# Patient Record
Sex: FEMALE | Race: NATIVE HAWAIIAN OR OTHER PACIFIC ISLANDER | HISPANIC OR LATINO | URBAN - METROPOLITAN AREA
[De-identification: names, ages, dates, MRNs, and addresses within clinical notes are randomized per-mention and may not be internally consistent; named-entity substitution may affect disease eponyms.]

---

## 2023-10-25 ENCOUNTER — CLAIMS CREATED FROM THE CLAIM WINDOW (OUTPATIENT)
Dept: URBAN - METROPOLITAN AREA CLINIC 48 | Facility: CLINIC | Age: 23
End: 2023-10-25
Payer: COMMERCIAL

## 2023-10-25 VITALS
HEIGHT: 64 IN | SYSTOLIC BLOOD PRESSURE: 111 MMHG | OXYGEN SATURATION: 99 % | DIASTOLIC BLOOD PRESSURE: 75 MMHG | BODY MASS INDEX: 37.83 KG/M2 | HEART RATE: 80 BPM | WEIGHT: 221.6 LBS | TEMPERATURE: 97.5 F

## 2023-10-25 DIAGNOSIS — R63.4 WEIGHT LOSS: ICD-10-CM

## 2023-10-25 DIAGNOSIS — R10.32 LLQ PAIN: ICD-10-CM

## 2023-10-25 DIAGNOSIS — K21.9 GASTROESOPHAGEAL REFLUX DISEASE, UNSPECIFIED WHETHER ESOPHAGITIS PRESENT: ICD-10-CM

## 2023-10-25 DIAGNOSIS — R10.13 DYSPEPSIA: ICD-10-CM

## 2023-10-25 PROBLEM — 235595009: Status: ACTIVE | Noted: 2023-10-25

## 2023-10-25 PROCEDURE — 99204 OFFICE O/P NEW MOD 45 MIN: CPT | Performed by: INTERNAL MEDICINE

## 2023-10-25 PROCEDURE — 99204 OFFICE O/P NEW MOD 45 MIN: CPT | Performed by: PHYSICIAN ASSISTANT

## 2023-10-25 RX ORDER — PYRITHIONE ZINC 0.25 %
AS DIRECTED SPRAY, NON-AEROSOL (ML) TOPICAL
Status: ACTIVE | COMMUNITY

## 2023-10-25 RX ORDER — NORETHINDRONE ACETATE AND ETHINYL ESTRADIOL AND FERROUS FUMARATE 1MG-20(21)
TAKE ONE TABLET BY MOUTH EVERY MORNING KIT ORAL
Qty: 84 UNSPECIFIED | Refills: 1 | Status: ACTIVE | COMMUNITY

## 2023-10-25 RX ORDER — PANTOPRAZOLE SODIUM 40 MG/1
1 TABLET 30 MINUTES PRIOR TO BREAKFAST TABLET, DELAYED RELEASE ORAL ONCE A DAY
Qty: 30 | Refills: 3 | OUTPATIENT
Start: 2023-10-25

## 2023-10-25 RX ORDER — DICYCLOMINE HYDROCHLORIDE 20 MG/1
1 TABLET TABLET ORAL
Qty: 30 | Refills: 2 | OUTPATIENT
Start: 2023-10-25 | End: 2024-01-22

## 2023-10-25 RX ORDER — CICLOPIROX 80 MG/ML
APPLY TO AFFECTED AREA(S) TOPICALLY ONCE DAILY IN THE MORNING SOLUTION TOPICAL
Qty: 6.6 UNSPECIFIED | Refills: 0 | Status: ACTIVE | COMMUNITY

## 2023-10-25 RX ORDER — KETOCONAZOLE 20 MG/ML
WASH SCALP 2-3 TIMES WEEKLY, LEAVE ON 5MIN THEN RINSE SHAMPOO, SUSPENSION TOPICAL
Qty: 360 UNSPECIFIED | Refills: 1 | Status: ACTIVE | COMMUNITY

## 2023-10-25 RX ORDER — OMEPRAZOLE 20 MG/1
1 CAPSULE 30 MINUTES BEFORE MORNING MEAL CAPSULE, DELAYED RELEASE ORAL ONCE A DAY
Status: ON HOLD | COMMUNITY

## 2023-10-25 NOTE — HPI-TODAY'S VISIT:
24 y/o female is being referred by GYN for evaluation of chronic LLQ pain which started around April. She has had a GYN U/S suggestive of polycystic ovaries and was started on BCP. She states her GYN felt the pain should not be persisting and advised GI evaluation. The pain is described as sharp/cramping/stabbing and occurs sporadically a few times per week, but only usually lasts a few minutes at a time. She has 2-3 bowel movements per day and usually has a bowel movement soon after eating; denies any significant diarrhea, constipation, and denies blood in the stool. She has no known family h/o CRC or IBD. Additionally, she reports new onset indigestion and epigastric pain, worsened by eating, and she has lost 10 pounds in the last few months. She took OTC Prilosec for 2 weeks with relief, but symptoms returned after stopping. She denies any regular NSAID use. Labs in August showed a normal CBC and CMP.

## 2024-01-02 ENCOUNTER — DASHBOARD ENCOUNTERS (OUTPATIENT)
Age: 24
End: 2024-01-02

## 2024-01-03 ENCOUNTER — OFFICE VISIT (OUTPATIENT)
Dept: URBAN - METROPOLITAN AREA CLINIC 48 | Facility: CLINIC | Age: 24
End: 2024-01-03
Payer: COMMERCIAL

## 2024-01-03 VITALS
HEART RATE: 85 BPM | HEIGHT: 64 IN | WEIGHT: 219.2 LBS | TEMPERATURE: 97.9 F | SYSTOLIC BLOOD PRESSURE: 108 MMHG | BODY MASS INDEX: 37.42 KG/M2 | DIASTOLIC BLOOD PRESSURE: 75 MMHG

## 2024-01-03 DIAGNOSIS — R63.4 WEIGHT LOSS: ICD-10-CM

## 2024-01-03 DIAGNOSIS — K21.9 ACID REFLUX: ICD-10-CM

## 2024-01-03 DIAGNOSIS — R10.32 LLQ PAIN: ICD-10-CM

## 2024-01-03 PROCEDURE — 99213 OFFICE O/P EST LOW 20 MIN: CPT | Performed by: INTERNAL MEDICINE

## 2024-01-03 RX ORDER — PYRITHIONE ZINC 0.25 %
AS DIRECTED SPRAY, NON-AEROSOL (ML) TOPICAL
Status: ACTIVE | COMMUNITY

## 2024-01-03 RX ORDER — NORETHINDRONE ACETATE AND ETHINYL ESTRADIOL AND FERROUS FUMARATE 1MG-20(21)
TAKE ONE TABLET BY MOUTH EVERY MORNING KIT ORAL
Qty: 84 UNSPECIFIED | Refills: 1 | Status: ACTIVE | COMMUNITY

## 2024-01-03 RX ORDER — KETOCONAZOLE 20 MG/ML
WASH SCALP 2-3 TIMES WEEKLY, LEAVE ON 5MIN THEN RINSE SHAMPOO, SUSPENSION TOPICAL
Qty: 360 UNSPECIFIED | Refills: 1 | Status: ACTIVE | COMMUNITY

## 2024-01-03 NOTE — HPI-TODAY'S VISIT:
24 y/o female presents for follow up. She was seen at the end of October after referral from GYN for evaluation of chronic LLQ pain which started around April. She has had a GYN U/S suggestive of polycystic ovaries and was started on BCP. She stated her GYN felt the pain should not be persisting and advised GI evaluation. The pain was described as sharp/cramping/stabbing and occurs sporadically a few times per week, but only usually lasts a few minutes at a time. She has 2-3 bowel movements per day and usually has a bowel movement soon after eating; denies any significant diarrhea, constipation, and denies blood in the stool. She has no known family h/o CRC or IBD. Additionally, she reported new onset indigestion and epigastric pain, worsened by eating, and she had lost 10 pounds in the last few months. She took OTC Prilosec for 2 weeks with relief, but symptoms returned after stopping. She denied any regular NSAID use. Labs in August showed a normal CBC and CMP.  She was treated with Pantoprazole and GERD symptoms resolved after two weeks; she continued daily dosing for about a month and a half and then stopped and has had no recurrence of symptoms. Lower abdominal pain has resolved and she only took Dicyclomine one time which resulted in constipation. She feels well and has no new concerns.

## 2024-07-29 ENCOUNTER — OFFICE VISIT (OUTPATIENT)
Dept: URBAN - METROPOLITAN AREA CLINIC 48 | Facility: CLINIC | Age: 24
End: 2024-07-29
Payer: COMMERCIAL

## 2024-07-29 VITALS
OXYGEN SATURATION: 99 % | BODY MASS INDEX: 38.62 KG/M2 | HEIGHT: 64 IN | SYSTOLIC BLOOD PRESSURE: 108 MMHG | TEMPERATURE: 98.1 F | HEART RATE: 72 BPM | DIASTOLIC BLOOD PRESSURE: 75 MMHG | WEIGHT: 226.2 LBS

## 2024-07-29 DIAGNOSIS — R10.32 LLQ PAIN: ICD-10-CM

## 2024-07-29 DIAGNOSIS — K58.0 IRRITABLE BOWEL SYNDROME WITH DIARRHEA: ICD-10-CM

## 2024-07-29 DIAGNOSIS — R10.13 DYSPEPSIA: ICD-10-CM

## 2024-07-29 PROBLEM — 197125005: Status: ACTIVE | Noted: 2024-07-29

## 2024-07-29 PROCEDURE — 99214 OFFICE O/P EST MOD 30 MIN: CPT | Performed by: PHYSICIAN ASSISTANT

## 2024-07-29 RX ORDER — RIFAXIMIN 550 MG/1
1 TABLET TABLET ORAL THREE TIMES A DAY
Qty: 42 TABLET | Refills: 1 | OUTPATIENT
Start: 2024-07-29 | End: 2024-08-26

## 2024-07-29 RX ORDER — NORETHINDRONE ACETATE AND ETHINYL ESTRADIOL AND FERROUS FUMARATE 1MG-20(21)
1 TABLET KIT ORAL ONCE A DAY
Refills: 1 | Status: ON HOLD | COMMUNITY

## 2024-07-29 RX ORDER — PANTOPRAZOLE SODIUM 40 MG/1
1 TABLET TABLET, DELAYED RELEASE ORAL ONCE A DAY
Qty: 60 | Refills: 0 | OUTPATIENT
Start: 2024-07-29

## 2024-07-29 RX ORDER — OMEPRAZOLE 40 MG/1
1 CAPSULE 30 MINUTES BEFORE MORNING MEAL CAPSULE, DELAYED RELEASE ORAL ONCE A DAY
Status: ACTIVE | COMMUNITY

## 2024-07-29 RX ORDER — PYRITHIONE ZINC 0.25 %
AS DIRECTED SPRAY, NON-AEROSOL (ML) TOPICAL
Status: ACTIVE | COMMUNITY

## 2024-07-29 RX ORDER — KETOCONAZOLE 20 MG/ML
WASH SCALP 2-3 TIMES WEEKLY, LEAVE ON 5MIN THEN RINSE SHAMPOO, SUSPENSION TOPICAL
Qty: 360 UNSPECIFIED | Refills: 1 | Status: ACTIVE | COMMUNITY

## 2024-07-29 RX ORDER — MEDROXYPROGESTERONE ACETATE 10 MG/1
1 TABLET WITH FOOD TABLET ORAL
Status: ACTIVE | COMMUNITY

## 2024-07-29 NOTE — HPI-TODAY'S VISIT:
24 y/o female presents for follow up. She was seen at the end of October after referral from GYN for evaluation of chronic LLQ pain which started around April. She has had a GYN U/S suggestive of polycystic ovaries and was started on BCP. She stated her GYN felt the pain should not be persisting and advised GI evaluation. The pain was described as sharp/cramping/stabbing and occurs sporadically a few times per week, but only usually lasts a few minutes at a time. She has 2-3 bowel movements per day and usually has a bowel movement soon after eating; denies any significant diarrhea, constipation, and denies blood in the stool. She has no known family h/o CRC or IBD. Additionally, she reported new onset indigestion and epigastric pain, worsened by eating, and she had lost 10 pounds in the last few months. She took OTC Prilosec for 2 weeks with relief, but symptoms returned after stopping. She denied any regular NSAID use. Labs in August showed a normal CBC and CMP.  Follow up 1/3/24: She was treated with Pantoprazole and GERD symptoms resolved after two weeks; she continued daily dosing for about a month and a half and then stopped and has had no recurrence of symptoms. Lower abdominal pain has resolved and she only took Dicyclomine one time which resulted in constipation. She feels well and has no new concerns.  7/29/24: Returns today due to return of sharp LLQ pain occurring 3-4 days per week, worse when sitting. She also has had increase in bowel movements and looser/softer stools, improving in the last few days. She denies blood or mucus in stool. She so GYN who did not feel her ovaries were contributing. Additionally, she reports frequent nausea and intermittent upper abdominal burning; denies NSAID use. She took OTC Omeprazole without relief, but had resolution oif symptoms previously with Rx Pantoprazole.

## 2024-07-30 ENCOUNTER — TELEPHONE ENCOUNTER (OUTPATIENT)
Dept: URBAN - METROPOLITAN AREA CLINIC 46 | Facility: CLINIC | Age: 24
End: 2024-07-30

## 2024-08-06 ENCOUNTER — TELEPHONE ENCOUNTER (OUTPATIENT)
Dept: URBAN - METROPOLITAN AREA CLINIC 46 | Facility: CLINIC | Age: 24
End: 2024-08-06

## 2024-08-14 ENCOUNTER — OFFICE VISIT (OUTPATIENT)
Dept: URBAN - METROPOLITAN AREA CLINIC 48 | Facility: CLINIC | Age: 24
End: 2024-08-14

## 2024-10-23 ENCOUNTER — OFFICE VISIT (OUTPATIENT)
Dept: URBAN - METROPOLITAN AREA CLINIC 48 | Facility: CLINIC | Age: 24
End: 2024-10-23
Payer: COMMERCIAL

## 2024-10-23 VITALS
DIASTOLIC BLOOD PRESSURE: 73 MMHG | WEIGHT: 237.2 LBS | HEIGHT: 64 IN | OXYGEN SATURATION: 99 % | BODY MASS INDEX: 40.49 KG/M2 | SYSTOLIC BLOOD PRESSURE: 108 MMHG | TEMPERATURE: 98.1 F | HEART RATE: 76 BPM

## 2024-10-23 DIAGNOSIS — R10.32 LLQ PAIN: ICD-10-CM

## 2024-10-23 DIAGNOSIS — K21.9 GASTROESOPHAGEAL REFLUX DISEASE, UNSPECIFIED WHETHER ESOPHAGITIS PRESENT: ICD-10-CM

## 2024-10-23 DIAGNOSIS — R19.4 ALTERATION IN BOWEL ELIMINATION: ICD-10-CM

## 2024-10-23 DIAGNOSIS — R10.13 DYSPEPSIA: ICD-10-CM

## 2024-10-23 PROCEDURE — 99214 OFFICE O/P EST MOD 30 MIN: CPT | Performed by: INTERNAL MEDICINE

## 2024-10-23 RX ORDER — PANTOPRAZOLE SODIUM 40 MG/1
1 TABLET TABLET, DELAYED RELEASE ORAL ONCE A DAY
Qty: 60 | Refills: 0 | Status: ACTIVE | COMMUNITY
Start: 2024-07-29

## 2024-10-23 RX ORDER — KETOCONAZOLE 20 MG/ML
WASH SCALP 2-3 TIMES WEEKLY, LEAVE ON 5MIN THEN RINSE SHAMPOO, SUSPENSION TOPICAL
Qty: 360 UNSPECIFIED | Refills: 1 | Status: ON HOLD | COMMUNITY

## 2024-10-23 RX ORDER — OMEPRAZOLE 40 MG/1
1 CAPSULE 30 MINUTES BEFORE MORNING MEAL CAPSULE, DELAYED RELEASE ORAL ONCE A DAY
Status: ON HOLD | COMMUNITY

## 2024-10-23 RX ORDER — PANTOPRAZOLE SODIUM 40 MG/1
1 TABLET TABLET, DELAYED RELEASE ORAL ONCE A DAY
Qty: 90 | Refills: 1
Start: 2024-07-29

## 2024-10-23 RX ORDER — PYRITHIONE ZINC 0.25 %
AS DIRECTED SPRAY, NON-AEROSOL (ML) TOPICAL
Status: ACTIVE | COMMUNITY

## 2024-10-23 RX ORDER — CHOLECALCIFEROL (VITAMIN D3) 50 MCG
1 TABLET TABLET ORAL ONCE A DAY
Status: ACTIVE | COMMUNITY

## 2024-10-23 RX ORDER — MEDROXYPROGESTERONE ACETATE 10 MG/1
1 TABLET WITH FOOD TABLET ORAL
Status: ACTIVE | COMMUNITY

## 2024-10-23 RX ORDER — NORETHINDRONE ACETATE AND ETHINYL ESTRADIOL AND FERROUS FUMARATE 1MG-20(21)
1 TABLET KIT ORAL ONCE A DAY
Refills: 1 | Status: ON HOLD | COMMUNITY

## 2024-10-23 RX ORDER — FEXOFENADINE HYDROCHLORIDE 60 MG/1
1 TABLET TABLET, FILM COATED ORAL TWICE A DAY
Status: ACTIVE | COMMUNITY

## 2024-10-23 RX ORDER — DICYCLOMINE HYDROCHLORIDE 10 MG/1
1 CAPSULE 30 MINUTES BEFORE EATING BREAKFAST CAPSULE ORAL ONCE A DAY
Qty: 30 | Refills: 2 | OUTPATIENT
Start: 2024-10-23 | End: 2025-01-20

## 2024-10-23 NOTE — HPI-TODAY'S VISIT:
24 y/o female presents for follow up. She was seen at the end of October after referral from GYN for evaluation of chronic LLQ pain which started around April. She has had a GYN U/S suggestive of polycystic ovaries and was started on BCP. She stated her GYN felt the pain should not be persisting and advised GI evaluation. The pain was described as sharp/cramping/stabbing and occurs sporadically a few times per week, but only usually lasts a few minutes at a time. She has 2-3 bowel movements per day and usually has a bowel movement soon after eating; denies any significant diarrhea, constipation, and denies blood in the stool. She has no known family h/o CRC or IBD. Additionally, she reported new onset indigestion and epigastric pain, worsened by eating, and she had lost 10 pounds in the last few months. She took OTC Prilosec for 2 weeks with relief, but symptoms returned after stopping. She denied any regular NSAID use. Labs in August showed a normal CBC and CMP.  Follow up 1/3/24: She was treated with Pantoprazole and GERD symptoms resolved after two weeks; she continued daily dosing for about a month and a half and then stopped and has had no recurrence of symptoms. Lower abdominal pain has resolved and she only took Dicyclomine one time which resulted in constipation. She feels well and has no new concerns.  7/29/24: Returns today due to return of sharp LLQ pain occurring 3-4 days per week, worse when sitting. She also has had increase in bowel movements and looser/softer stools, improving in the last few days. She denies blood or mucus in stool. She so GYN who did not feel her ovaries were contributing. Additionally, she reports frequent nausea and intermittent upper abdominal burning; denies NSAID use. She took OTC Omeprazole without relief, but had resolution oif symptoms previously with Rx Pantoprazole. PPI resumed and treated with Xifaxan probiotic.  10/23/24 for follow up: Reflux/dyspepsia resolved with PPI, but returned when she ran out; resumed again two weeks ago with incomplete relief. Diarrhea resolved while on Xifaxan but has returned since completion; usually clustered loose/soft stool in AM. She has had a few further episodes of lower cramping pain, but not as bothersome. Given persistent symptoms, she would like to proceed with endoscopic evaluation.

## 2024-12-05 ENCOUNTER — CLAIMS CREATED FROM THE CLAIM WINDOW (OUTPATIENT)
Dept: URBAN - METROPOLITAN AREA SURGERY CENTER 27 | Facility: SURGERY CENTER | Age: 24
End: 2024-12-05
Payer: COMMERCIAL

## 2024-12-05 ENCOUNTER — CLAIMS CREATED FROM THE CLAIM WINDOW (OUTPATIENT)
Dept: URBAN - METROPOLITAN AREA CLINIC 4 | Facility: CLINIC | Age: 24
End: 2024-12-05
Payer: COMMERCIAL

## 2024-12-05 DIAGNOSIS — K63.89 OTHER SPECIFIED DISEASES OF INTESTINE: ICD-10-CM

## 2024-12-05 DIAGNOSIS — R10.13 ABDOMINAL DISCOMFORT, EPIGASTRIC: ICD-10-CM

## 2024-12-05 DIAGNOSIS — R10.32 ABDOMINAL CRAMPING IN LEFT LOWER QUADRANT: ICD-10-CM

## 2024-12-05 DIAGNOSIS — K31.89 OTHER DISEASES OF STOMACH AND DUODENUM: ICD-10-CM

## 2024-12-05 DIAGNOSIS — R10.31 ABDOMINAL CRAMPING IN RIGHT LOWER QUADRANT: ICD-10-CM

## 2024-12-05 PROCEDURE — 00813 ANES UPR LWR GI NDSC PX: CPT | Performed by: ANESTHESIOLOGY

## 2024-12-05 PROCEDURE — 43239 EGD BIOPSY SINGLE/MULTIPLE: CPT | Performed by: INTERNAL MEDICINE

## 2024-12-05 PROCEDURE — 88305 TISSUE EXAM BY PATHOLOGIST: CPT | Performed by: PATHOLOGY

## 2024-12-05 PROCEDURE — 45378 DIAGNOSTIC COLONOSCOPY: CPT | Performed by: INTERNAL MEDICINE

## 2024-12-05 PROCEDURE — 88312 SPECIAL STAINS GROUP 1: CPT | Performed by: PATHOLOGY

## 2024-12-05 RX ORDER — FEXOFENADINE HYDROCHLORIDE 60 MG/1
1 TABLET TABLET, FILM COATED ORAL TWICE A DAY
Status: ACTIVE | COMMUNITY

## 2024-12-05 RX ORDER — PANTOPRAZOLE SODIUM 40 MG/1
1 TABLET TABLET, DELAYED RELEASE ORAL ONCE A DAY
Qty: 90 | Refills: 1 | Status: ACTIVE | COMMUNITY
Start: 2024-07-29

## 2024-12-05 RX ORDER — MEDROXYPROGESTERONE ACETATE 10 MG/1
1 TABLET WITH FOOD TABLET ORAL
Status: ACTIVE | COMMUNITY

## 2024-12-05 RX ORDER — NORETHINDRONE ACETATE AND ETHINYL ESTRADIOL AND FERROUS FUMARATE 1MG-20(21)
1 TABLET KIT ORAL ONCE A DAY
Refills: 1 | Status: ON HOLD | COMMUNITY

## 2024-12-05 RX ORDER — DICYCLOMINE HYDROCHLORIDE 10 MG/1
1 CAPSULE 30 MINUTES BEFORE EATING BREAKFAST CAPSULE ORAL ONCE A DAY
Qty: 30 | Refills: 2 | Status: ACTIVE | COMMUNITY
Start: 2024-10-23 | End: 2025-01-20

## 2024-12-05 RX ORDER — PYRITHIONE ZINC 0.25 %
AS DIRECTED SPRAY, NON-AEROSOL (ML) TOPICAL
Status: ACTIVE | COMMUNITY

## 2024-12-05 RX ORDER — CHOLECALCIFEROL (VITAMIN D3) 50 MCG
1 TABLET TABLET ORAL ONCE A DAY
Status: ACTIVE | COMMUNITY

## 2024-12-05 RX ORDER — KETOCONAZOLE 20 MG/ML
WASH SCALP 2-3 TIMES WEEKLY, LEAVE ON 5MIN THEN RINSE SHAMPOO, SUSPENSION TOPICAL
Qty: 360 UNSPECIFIED | Refills: 1 | Status: ON HOLD | COMMUNITY

## 2024-12-05 RX ORDER — OMEPRAZOLE 40 MG/1
1 CAPSULE 30 MINUTES BEFORE MORNING MEAL CAPSULE, DELAYED RELEASE ORAL ONCE A DAY
Status: ON HOLD | COMMUNITY